# Patient Record
Sex: MALE | Race: WHITE | NOT HISPANIC OR LATINO
[De-identification: names, ages, dates, MRNs, and addresses within clinical notes are randomized per-mention and may not be internally consistent; named-entity substitution may affect disease eponyms.]

---

## 2018-11-29 ENCOUNTER — APPOINTMENT (OUTPATIENT)
Dept: GASTROENTEROLOGY | Facility: HOSPITAL | Age: 68
End: 2018-11-29

## 2018-11-29 PROBLEM — Z00.00 ENCOUNTER FOR PREVENTIVE HEALTH EXAMINATION: Status: ACTIVE | Noted: 2018-11-29

## 2021-08-16 ENCOUNTER — FORM ENCOUNTER (OUTPATIENT)
Age: 71
End: 2021-08-16

## 2023-02-09 ENCOUNTER — APPOINTMENT (OUTPATIENT)
Dept: GASTROENTEROLOGY | Facility: CLINIC | Age: 73
End: 2023-02-09
Payer: MEDICARE

## 2023-02-09 VITALS
SYSTOLIC BLOOD PRESSURE: 140 MMHG | HEIGHT: 69 IN | DIASTOLIC BLOOD PRESSURE: 80 MMHG | WEIGHT: 170 LBS | BODY MASS INDEX: 25.18 KG/M2

## 2023-02-09 PROCEDURE — 99204 OFFICE O/P NEW MOD 45 MIN: CPT

## 2023-02-09 NOTE — ASSESSMENT
[FreeTextEntry1] : GERD +/- dysphagia:  EGD with proximal biopsies planned.  Dietary and lifestyle  modification\par \par Pertinent available records reviewed\par Risks of the procedure including but not limited to bleeding / perforation / infection / anesthesia complication / missed polyp or lesion explained to the  patient . The patient expressed understanding and a desire to proceed with the procedure.\par \par Risk of not doing procedure includes but is not limited to missed or delayed diagnosis of gastrointestinal pathology.\par

## 2023-02-09 NOTE — PHYSICAL EXAM
[Alert] : alert [Sclera] : the sclera and conjunctiva were normal [Hearing Threshold Finger Rub Not [place]] : hearing was normal [Normal Appearance] : the appearance of the neck was normal [No Respiratory Distress] : no respiratory distress [None] : no edema [Abdomen Soft] : soft [Abnormal Walk] : normal gait [Normal Color / Pigmentation] : normal skin color and pigmentation [No Focal Deficits] : no focal deficits [Oriented To Time, Place, And Person] : oriented to person, place, and time

## 2023-02-09 NOTE — HISTORY OF PRESENT ILLNESS
[FreeTextEntry1] : Presents  with a 1 month c/o intermittent  " sour" taste in his  esophagus after pasta / ETOH.  Also with occasional sensation of  food " sticking" in mid esophagus.\par \par Denies nausea, vomiting, fever, chills, diarrhea, constipation, melena, hematemesis, BRBPR\par \par Colonoscopy 2018 ( Dr. Gaona) : diverticulosis

## 2023-03-15 ENCOUNTER — RESULT REVIEW (OUTPATIENT)
Age: 73
End: 2023-03-15

## 2023-03-16 ENCOUNTER — APPOINTMENT (OUTPATIENT)
Dept: GASTROENTEROLOGY | Facility: HOSPITAL | Age: 73
End: 2023-03-16

## 2023-06-12 ENCOUNTER — RX RENEWAL (OUTPATIENT)
Age: 73
End: 2023-06-12

## 2023-06-12 RX ORDER — OMEPRAZOLE 40 MG/1
40 CAPSULE, DELAYED RELEASE ORAL
Qty: 30 | Refills: 2 | Status: ACTIVE | COMMUNITY
Start: 2023-03-16 | End: 1900-01-01

## 2023-07-05 ENCOUNTER — APPOINTMENT (OUTPATIENT)
Dept: ORTHOPEDIC SURGERY | Facility: CLINIC | Age: 73
End: 2023-07-05
Payer: MEDICARE

## 2023-07-05 DIAGNOSIS — Z78.9 OTHER SPECIFIED HEALTH STATUS: ICD-10-CM

## 2023-07-05 PROCEDURE — 99443: CPT

## 2023-07-18 DIAGNOSIS — K21.9 GASTRO-ESOPHAGEAL REFLUX DISEASE W/OUT ESOPHAGITIS: ICD-10-CM

## 2023-07-24 ENCOUNTER — RESULT REVIEW (OUTPATIENT)
Age: 73
End: 2023-07-24

## 2023-07-24 ENCOUNTER — APPOINTMENT (OUTPATIENT)
Dept: ORTHOPEDIC SURGERY | Facility: CLINIC | Age: 73
End: 2023-07-24
Payer: MEDICARE

## 2023-07-24 PROCEDURE — 20610 DRAIN/INJ JOINT/BURSA W/O US: CPT | Mod: RT

## 2023-07-24 PROCEDURE — 99213 OFFICE O/P EST LOW 20 MIN: CPT | Mod: 25

## 2023-07-24 RX ADMIN — Medication 0 MG/ML: at 00:00

## 2023-07-24 NOTE — ASSESSMENT
[FreeTextEntry1] : MONDAY, JULY 24, 2023\par \par PAIN RIGHT KNEE\par \par OFFICE    X-RAYS    EXAMINATION    ?  DEPO STEROID INJECTION RIGHT KNEE      TO INITIATE SHOT CYCLE\par \par We met and Verified pertinent portions of the recently completed telephone consultation's\par  history\par We vacillated, even agonizing to back and forth about his current status what he is looking for what is missing and how urgently he wants to go forward with the knee replacement versus try some injections.  He has had a good deal of experience with shots in the past before I turned him over to Dr. Tsang for knee replacement.  I reassured him that there was no downside to having 2 knee replacements which was a concern but that he did not have to push himself to have surgery now if that is not his wish for any reason\par \par \par PHYSICAL EXAM\par \par GAIT he does not limp at slow speed perhaps a little when he picks it up he is slightly bowlegged\par \par PELVIS/TRUNK\par His pelvis is level trunk well-balanced above it\par CRITICAL LOWER EXTREMITY MUSCLE STRENGTH (previous left partial foot drop)\par On strength testing of the specific muscles innervated between the L2 and S1 nerve roots of the spine:\par Patient can comfortably go up on both toes together Standing on the balls of the feet with the heel swinging into varus \par Rock back on both heels together, lifting the ball of each foot off the ground against the body weight\par Can stand on either leg alone with minimal single hand support in the midline without having the pelvis sag Down over the raised leg (normal Trendelenburg test) \par DEEP TENDON REFLEXES\par +2 symmetrical\par LOWER EXTREMITIES/LENGTH\par Equal in length with no distal edema trophic or stasis change and good pedal pulses bilaterally\par KNEES he can step up and down on an 9 inch riser repeatedly with either leg and does not have to bend the knee on the downside to launch up suggesting that his buttocks and thighs on both sides are strong\par RIGHT arthritic symptomatic\par 1 degree of varus not unstable no effusion or synovitis no Baker's cyst calf engorgement or tenderness.  He extends fully perhaps even a little hyperextensibility and flexes 125 degrees with slight pain in forced flexion.  No wobbliness no quad or patella tendon irritation\par LEFT prosthetic\par Stable well aligned without swelling extends fully flexes 125.\par HIPS\par The hips are palpated and ranged. They are supple painless and mobile with a predominance of external over internal rotability in flexion and extension suggesting appropriate femoral version or torsion, with it’s attendant consequences for patellar tracking. \par \par \par X-RAYS\par BOTH KNEES 4 VIEWS WITH STANDING PROJECTIONS\par RIGHT ARTHRITIC\par 1-1/2 degrees of varus with generous medial tibiofemoral bone-on-bone front and back no subluxation no pseudogout no vascular calcifications no loose bodies\par LEFT PROSTHETIC\par Well-positioned well fixed with no evidence of failure fatigue fracture loosening.  Aligned in slight valgus loading bicondylar only everything looks good\par \par IMPRESSION\par The choices his about surgery and its timing and for the time being he has decided he will hold off and try a set of injections: Viscosupplementation\par Which I described to him as follows:\par These shots (as an integral portion of a more comprehensive program) have allowed the patient to remain both satisfied  and sufficiently active (as quantitated  for each new cycle) so as to maintain their stamina, their strength and flexibility as required to qualify for these injections as an alternative to knee replacement. \par The shots consist of an initial injection of water insoluble steroid followed closely, if and when the inflammation is maximally & well suppressed, with a single low-dose of hyaluronic acid. It is intended that this will prime or  induce an irritated osteoarthritic knee to resume more  optimal daily production of its own HA as an important and soothing component of joint fluid, which when present, reduces pain even when the knee is significantly worn. \par  The added monitored use of NSAIDs discreetly in short intermittent pulses, can increase the number of optimal days, if modulation of rest and activity are added by and educated and mindful, desiring and disciplined patient never taken nonsteroidal anti-inflammatory agent never taken nonsteroidal anti-inflammatory agent Aleve Advil\par \par PLAN   it is his choice to go forward with the cycle of injections now\par \par Aspiration?  Not necessary\par \par  This done, we agreed to go forward with the Depo steriod injection as planned to\par \par After timeout and consent the RIGHT   Knee was injected with a freehand technique with 80 mg of Depo-Medrol (water-insoluble steroid) through triple-prepped skin on the medial side of the patellofemoral articulation at its midpoint.  This was done easily by my manually subluxing the patella from lateral to medial, creating a generous subluxation cavity on the inner side into which the needle could be plunged swiftly, painlessly, safely and accurately and which the patient tolerated well.\par \par \par The patient will return expeditiously to verify that the effect has been salubrious and if so, as expected and planned, to then administer a low dose of hyaluronic acid to restore his knee to an optimal state with mechanically induced inflammation suppressed, and hyaluronic acid concentrations optimized through Visco supplementation with the hope that he will maintain and optimized state thereafter for some weeks on his own\par \par I will keep you posted on what transpires, and remain\par \par Respectfully,\par \par jrs\par \par Stefan Pendleton MD

## 2023-07-28 ENCOUNTER — APPOINTMENT (OUTPATIENT)
Dept: ORTHOPEDIC SURGERY | Facility: CLINIC | Age: 73
End: 2023-07-28
Payer: MEDICARE

## 2023-07-28 PROCEDURE — 20610 DRAIN/INJ JOINT/BURSA W/O US: CPT | Mod: RT

## 2023-07-28 PROCEDURE — 99212 OFFICE O/P EST SF 10 MIN: CPT | Mod: 24

## 2023-07-28 RX ORDER — OMEPRAZOLE 40 MG/1
40 CAPSULE, DELAYED RELEASE ORAL
Qty: 30 | Refills: 0 | Status: ACTIVE | COMMUNITY
Start: 2023-07-28 | End: 1900-01-01

## 2023-07-28 RX ORDER — NAPROXEN 500 MG/1
500 TABLET ORAL
Qty: 60 | Refills: 0 | Status: ACTIVE | COMMUNITY
Start: 2023-07-28 | End: 1900-01-01

## 2023-07-28 RX ADMIN — Medication 0 MG/2ML: at 00:00

## 2023-07-28 NOTE — ASSESSMENT
[FreeTextEntry1] : FRIDAY, JULY 28, 2023\par \par PAIN RIGHT KNEE SP DEPO STEROID INJECTION RIGHT KNEE\par \par OFFICE PLAN VISCOSUPPLEMENTATION WITH SYNVISC LOW-DOSE BRAND HYALURONIC ACID INJECTION RIGHT KNEE TO COMPLETE SHOT CYCLE #1\par \par \par The patient returns satisfied just days after a steroid injection of the symptomatic arthritic knee(s) RIGHT VARUS SYMPTOMATIC ARTHRITIC KNEE\par with improvement in mobility, with disappearance of pain in forced flexion and with no evidence of any effusion, synovitis, bakerÂ´s cyst, calf engorgement or tenderness. Walking ability is improved.\par \par BECAUSE OF THE DRAMATIC IMPROVEMENT, HIS MOOD IS BOUYED and he realizes that he does not want to have the surgery if he can possibly avoid\par \par The improvement in the right knee is attributable to the suppression of inflammation substantially but briefly achieved by steroid. Now and its peak effect, I have recommended a low-dose injection of HA to induce the knee to resume its most ideal synthesis of this important and soothing component of joint fluid, which if optimally present, helps even significantly osteoarthritic knees to be less painful and more functionally capable.\par \par After timeout and consent the right     knee was injected with a freehand technique with SYNVISC BRAND HYALURONIC ACID LOW-DOSE 2.5 ML     through triple-prepped skin on the medial side of the patellofemoral articulation at its midpoint.  This was done easily by my manually subluxing the patella from lateral to medial, creating a generous subluxation cavity on the inner side into which the needle could be plunged swiftly, painlessly, safely and accurately and which the patient tolerated well.\par AFTER STERILE SWAP OF THE SYRINGE  \par The patient will telephone in a week to report the effect of this most recent injection.\par \par The goal of treatment for his arthritic right   knee to adequately synthesize native HA in enough volume, but also that new HA is persisting in that knee for the time being. I explained the competing tendencies: of the intensity of activity and its ability to produce wear debris, to incrementally proke inflamation, in each individual joint. This is offset by the ability of hyaluronic acid in a joint (even with worn surfaces), at any moment, to reduce ongoing, new generation of debris, by lubrication and cushioning, and thereby reduce future inflammation (from ongoing usage). HA also dampens or slows current inflammation, BUT in so doing, the mucous-like, HA molecules ARE SACRIFICED (through chemical - molecular degradation), The restoration and the buildup of newly synthesized HA molecules at night completes an optimal daily cycle, HAVING ENOUGH NATIVE HA IN AN OSTEOARTGRITIC KNEE, at the start of EACH day, and with the inflammatory processes quieted ( not stiff to bend in the morning) IS OPTIMAL, for as many of the 90 days in each single shot cycle, as possible\par \par The added monitored use of NSAIDs discretely in short intermittent pulses, can increase the number of optimal days, if modulation of rest and activity are added by and educated and mindful, desiring and disciplined patient\par He brought in the NSAIDs I have prescribed for him in the past: Naprosyn 500 and omeprazole 40 and states that this was very effective when he used it from time to time in the past.  The strategy would be only to take the drug when the knee hurts and having taken 1 always commit to a second tablet 12 hours later continuing every 12 hours until pain is controlled and then stop.  Never take the drug for more than 5 days continuously without calling myself to discuss the problem.  Take the proton pump inhibitor omeprazole 40 mg once each 24 hours when you are using the NSAID.  I RENEWED THESE MEDICATIONS AT HIS REQUEST UNDERSTANDING THAT THESE ARE NOT COVERED BY HIS PHARMACY PLAN BUT THAT THIS IS WHAT HELPED HIM WELL IN THE PAST I COUNSELED HIM TO WATCH FOR STOMACH IRRITATION OR ANY SWELLING IN HIS ANKLES AND CONTACT ME IF THE MEDICINE DOES NOT PRODUCE AN IMPROVEMENT AFTER 5 DAYS\par \par Although it is my hope that this will suffice for a full 3 months as is often the case with this patient the proviso exists for restoration to an ideal state as follows: At the end of 4 to 6 weeks, the patient will have the opportunity to receive a mid-cycle booster injection of HA following an initial suppressive prep with a short safe course of a stipulated NSAID which will guide the decision of whether or not to administer an additional low-dose, ``boosterÂ´Â´ of HA and furthermore, to assure the likelihood that a small dose of HA will restore a recrudescing knee to a more salubrious state without the ongoing need of NSAIDs.\par \par Otherwise if all goes ideally the patient will go their way, striving to be active, employing judicious rest when appropriate and using the NSAID & PPI at their disposal, as instructed and if allowed, as well as contacting me if there any questions or problems, before we meet next in 11 weeks to consider the option of additional injections/treatments. \par Questions answered and counseling provided to our mutual satisfaction\par \par I will keep you posted on what transpires and remain\par \par Respectfully\par Stefan Pendleton MD

## 2023-10-04 ENCOUNTER — APPOINTMENT (OUTPATIENT)
Dept: ORTHOPEDIC SURGERY | Facility: CLINIC | Age: 73
End: 2023-10-04
Payer: MEDICARE

## 2023-10-04 DIAGNOSIS — Z79.1 LONG TERM (CURRENT) USE OF NON-STEROIDAL ANTI-INFLAMMATORIES (NSAID): ICD-10-CM

## 2023-10-04 PROCEDURE — 99442: CPT

## 2023-10-13 ENCOUNTER — APPOINTMENT (OUTPATIENT)
Dept: ORTHOPEDIC SURGERY | Facility: CLINIC | Age: 73
End: 2023-10-13
Payer: MEDICARE

## 2023-10-13 VITALS — BODY MASS INDEX: 24.88 KG/M2 | HEIGHT: 69 IN | WEIGHT: 168 LBS

## 2023-10-13 DIAGNOSIS — M25.561 PAIN IN RIGHT KNEE: ICD-10-CM

## 2023-10-13 DIAGNOSIS — Z96.652 PRESENCE OF LEFT ARTIFICIAL KNEE JOINT: ICD-10-CM

## 2023-10-13 DIAGNOSIS — G89.29 PAIN IN RIGHT KNEE: ICD-10-CM

## 2023-10-13 PROCEDURE — 99213 OFFICE O/P EST LOW 20 MIN: CPT

## 2023-10-13 PROCEDURE — ZZZZZ: CPT

## 2023-10-20 ENCOUNTER — APPOINTMENT (OUTPATIENT)
Dept: ORTHOPEDIC SURGERY | Facility: CLINIC | Age: 73
End: 2023-10-20

## 2023-10-24 ENCOUNTER — RESULT REVIEW (OUTPATIENT)
Age: 73
End: 2023-10-24

## 2023-11-21 ENCOUNTER — RESULT REVIEW (OUTPATIENT)
Age: 73
End: 2023-11-21

## 2023-12-14 ENCOUNTER — APPOINTMENT (OUTPATIENT)
Dept: ORTHOPEDIC SURGERY | Facility: HOSPITAL | Age: 73
End: 2023-12-14
Payer: MEDICARE

## 2023-12-14 ENCOUNTER — APPOINTMENT (OUTPATIENT)
Dept: ORTHOPEDIC SURGERY | Facility: HOSPITAL | Age: 73
End: 2023-12-14

## 2023-12-14 ENCOUNTER — TRANSCRIPTION ENCOUNTER (OUTPATIENT)
Age: 73
End: 2023-12-14

## 2023-12-14 ENCOUNTER — RESULT REVIEW (OUTPATIENT)
Age: 73
End: 2023-12-14

## 2023-12-14 PROCEDURE — 27447 TOTAL KNEE ARTHROPLASTY: CPT

## 2023-12-29 DIAGNOSIS — M17.11 UNILATERAL PRIMARY OSTEOARTHRITIS, RIGHT KNEE: ICD-10-CM

## 2024-01-12 ENCOUNTER — RESULT REVIEW (OUTPATIENT)
Age: 74
End: 2024-01-12

## 2024-01-12 ENCOUNTER — APPOINTMENT (OUTPATIENT)
Dept: ORTHOPEDIC SURGERY | Facility: CLINIC | Age: 74
End: 2024-01-12

## 2024-01-12 ENCOUNTER — APPOINTMENT (OUTPATIENT)
Dept: ORTHOPEDIC SURGERY | Facility: CLINIC | Age: 74
End: 2024-01-12
Payer: MEDICARE

## 2024-01-12 VITALS — WEIGHT: 165 LBS | BODY MASS INDEX: 24.44 KG/M2 | HEIGHT: 69 IN

## 2024-01-12 DIAGNOSIS — Z96.651 PRESENCE OF RIGHT ARTIFICIAL KNEE JOINT: ICD-10-CM

## 2024-01-12 PROCEDURE — 99024 POSTOP FOLLOW-UP VISIT: CPT

## 2024-01-12 NOTE — PHYSICAL EXAM
[de-identified] : incision is well healed, clean dry and intact knee ranges from 0-115 degrees stable to aneror, posterior, varus and valgus stress no motor or sensory deficits and neurovasculary intact no clubbing, cyanosis or edema normal gait [de-identified] : tka looks good

## 2024-01-12 NOTE — HISTORY OF PRESENT ILLNESS
[de-identified] : patient is about one month out from their joint replacement.  overall doing well.  no major complaints.  pain well controlled.

## 2024-01-12 NOTE — DISCUSSION/SUMMARY
[de-identified] : one month out from joint replacement doing well we discussed medications, activities, precautions and fu

## 2024-04-08 ENCOUNTER — RESULT REVIEW (OUTPATIENT)
Age: 74
End: 2024-04-08

## 2024-04-09 ENCOUNTER — APPOINTMENT (OUTPATIENT)
Dept: GASTROENTEROLOGY | Facility: HOSPITAL | Age: 74
End: 2024-04-09

## 2024-10-18 ENCOUNTER — APPOINTMENT (OUTPATIENT)
Dept: PULMONOLOGY | Facility: CLINIC | Age: 74
End: 2024-10-18
Payer: MEDICARE

## 2024-10-18 VITALS
OXYGEN SATURATION: 98 % | DIASTOLIC BLOOD PRESSURE: 80 MMHG | HEART RATE: 104 BPM | WEIGHT: 170 LBS | SYSTOLIC BLOOD PRESSURE: 130 MMHG | HEIGHT: 69 IN | BODY MASS INDEX: 25.18 KG/M2

## 2024-10-18 DIAGNOSIS — R06.83 SNORING: ICD-10-CM

## 2024-10-18 DIAGNOSIS — G47.19 OTHER HYPERSOMNIA: ICD-10-CM

## 2024-10-18 DIAGNOSIS — Z78.9 OTHER SPECIFIED HEALTH STATUS: ICD-10-CM

## 2024-10-18 PROCEDURE — 99203 OFFICE O/P NEW LOW 30 MIN: CPT

## 2024-11-19 ENCOUNTER — NON-APPOINTMENT (OUTPATIENT)
Age: 74
End: 2024-11-19

## 2025-01-03 ENCOUNTER — APPOINTMENT (OUTPATIENT)
Dept: PULMONOLOGY | Facility: CLINIC | Age: 75
End: 2025-01-03
Payer: MEDICARE

## 2025-01-03 VITALS
BODY MASS INDEX: 25.92 KG/M2 | WEIGHT: 175 LBS | DIASTOLIC BLOOD PRESSURE: 70 MMHG | OXYGEN SATURATION: 98 % | HEART RATE: 70 BPM | HEIGHT: 69 IN | SYSTOLIC BLOOD PRESSURE: 120 MMHG

## 2025-01-03 DIAGNOSIS — G47.19 OTHER HYPERSOMNIA: ICD-10-CM

## 2025-01-03 DIAGNOSIS — G47.31 PRIMARY CENTRAL SLEEP APNEA: ICD-10-CM

## 2025-01-03 DIAGNOSIS — G47.33 OBSTRUCTIVE SLEEP APNEA (ADULT) (PEDIATRIC): ICD-10-CM

## 2025-01-03 DIAGNOSIS — F12.91 CANNABIS USE, UNSPECIFIED, IN REMISSION: ICD-10-CM

## 2025-01-03 PROCEDURE — 99213 OFFICE O/P EST LOW 20 MIN: CPT

## 2025-01-03 PROCEDURE — G2211 COMPLEX E/M VISIT ADD ON: CPT

## 2025-05-27 ENCOUNTER — APPOINTMENT (OUTPATIENT)
Dept: PULMONOLOGY | Facility: CLINIC | Age: 75
End: 2025-05-27
Payer: MEDICARE

## 2025-05-27 VITALS
HEART RATE: 64 BPM | DIASTOLIC BLOOD PRESSURE: 66 MMHG | WEIGHT: 172 LBS | SYSTOLIC BLOOD PRESSURE: 102 MMHG | HEIGHT: 69 IN | BODY MASS INDEX: 25.48 KG/M2 | OXYGEN SATURATION: 96 %

## 2025-05-27 DIAGNOSIS — G47.31 PRIMARY CENTRAL SLEEP APNEA: ICD-10-CM

## 2025-05-27 DIAGNOSIS — G47.33 OBSTRUCTIVE SLEEP APNEA (ADULT) (PEDIATRIC): ICD-10-CM

## 2025-05-27 PROCEDURE — 99213 OFFICE O/P EST LOW 20 MIN: CPT

## 2025-05-27 RX ORDER — UBIDECARENONE/VIT E ACET 100MG-5
CAPSULE ORAL
Refills: 0 | Status: ACTIVE | COMMUNITY